# Patient Record
Sex: MALE | Race: BLACK OR AFRICAN AMERICAN | URBAN - METROPOLITAN AREA
[De-identification: names, ages, dates, MRNs, and addresses within clinical notes are randomized per-mention and may not be internally consistent; named-entity substitution may affect disease eponyms.]

---

## 2022-11-15 ENCOUNTER — HOSPITAL ENCOUNTER (EMERGENCY)
Age: 27
Discharge: HOME | End: 2022-11-15

## 2022-11-15 VITALS
SYSTOLIC BLOOD PRESSURE: 172 MMHG | HEART RATE: 66 BPM | DIASTOLIC BLOOD PRESSURE: 72 MMHG | OXYGEN SATURATION: 96 % | RESPIRATION RATE: 20 BRPM | BODY MASS INDEX: 36.94 KG/M2 | WEIGHT: 280 LBS | TEMPERATURE: 98.2 F

## 2022-11-15 DIAGNOSIS — H10.45 OTHER CHRONIC ALLERGIC CONJUNCTIVITIS OF BOTH EYES: Primary | ICD-10-CM

## 2022-11-15 PROCEDURE — 99283 EMERGENCY DEPT VISIT LOW MDM: CPT

## 2022-11-15 RX ORDER — KETOTIFEN FUMARATE 0.35 MG/ML
1 SOLUTION/ DROPS OPHTHALMIC 2 TIMES DAILY
Qty: 1 ML | Refills: 0 | Status: SHIPPED | OUTPATIENT
Start: 2022-11-15 | End: 2022-11-25

## 2022-11-15 ASSESSMENT — PAIN - FUNCTIONAL ASSESSMENT: PAIN_FUNCTIONAL_ASSESSMENT: NONE - DENIES PAIN

## 2022-11-15 ASSESSMENT — ENCOUNTER SYMPTOMS
EYE ITCHING: 1
VOMITING: 0
ABDOMINAL PAIN: 0
DIARRHEA: 0
WHEEZING: 0
RHINORRHEA: 0
PHOTOPHOBIA: 0
EYE DISCHARGE: 1
SHORTNESS OF BREATH: 0
NAUSEA: 0
EYE PAIN: 0
COUGH: 0
EYE REDNESS: 1

## 2022-11-15 ASSESSMENT — VISUAL ACUITY
OU: 20/15
OS: 20/20
OD: 20/25

## 2022-11-15 NOTE — ED NOTES
Discharge paperwork given to and reviewed with pt. Pt verbalized understanding and all questions answered. Pt encouraged to return if having worsening symptoms or new symptoms discussed in discharge paperwork. Pt to follow up with [facility]  Rx x 1 given and medications reviewed with pt. Pt in NAD, RR even and unlabored.  Pt off unit ambulatory     [facility]  11/15/22 5775

## 2022-11-15 NOTE — ED PROVIDER NOTES
[facility]        Pt Name: Nicole Caruso  MRN: [id number]  [place] 1995  Date of evaluation: 11/15/2022  Provider: Shazia Harper PA-C  PCP: No primary care provider on file. Note Started: 9:43 AM EST 11/15/2022        POWER. I have evaluated this patient. My supervising physician was available for consultation. CHIEF COMPLAINT       Chief Complaint   Patient presents with    Conjunctivitis     C/o left eye watering, itchyness and redness times 2 weeks. Went to urgent care, no relief       HISTORY OF PRESENT ILLNESS   (Location, Timing/Onset, Context/Setting, Quality, Duration, Modifying Factors, Severity, Associated Signs and Symptoms)  Note limiting factors. Chief Complaint: Eye drainage     Nicole Caruso is a 32 y.o. male who presents for evaluation of bilateral eye itching watering and redness x2 weeks. States that he went to urgent care last week and was given prescriptions for polymyxin B and Augmentin. Symptoms have not improved. He denies injury or trauma. No foreign body or foreign body sensation. No visual changes. He does report history of seasonal allergies but does not take daily allergy medication. He also has some nasal congestion and sneezing at this time. No chest pain or shortness of breath. No cough. No abdominal pain nausea vomiting. No fevers or chills. He has no other complaints or concerns at this time. Nursing Notes were all reviewed and agreed with or any disagreements were addressed in the HPI. REVIEW OF SYSTEMS    (2-9 systems for level 4, 10 or more for level 5)     Review of Systems   Constitutional:  Negative for appetite change, chills and fever. HENT:  Negative for congestion and rhinorrhea. Eyes:  Positive for discharge, redness and itching. Negative for photophobia and pain. Respiratory:  Negative for cough, shortness of breath and wheezing.     Cardiovascular: Negative for chest pain. Gastrointestinal:  Negative for abdominal pain, diarrhea, nausea and vomiting. Genitourinary:  Negative for difficulty urinating, dysuria and hematuria. Musculoskeletal:  Negative for neck pain and neck stiffness. Skin:  Negative for rash. Neurological:  Negative for headaches. Positives and Pertinent negatives as per HPI. Except as noted above in the ROS, all other systems were reviewed and negative. PAST MEDICAL HISTORY   History reviewed. No pertinent past medical history. SURGICAL HISTORY     Past Surgical History:   Procedure Laterality Date    ANKLE SURGERY      ORIF and ligament repair left ankle         CURRENTMEDICATIONS       Previous Medications    No medications on file         ALLERGIES     Patient has no known allergies. FAMILYHISTORY     History reviewed. No pertinent family history. SOCIAL HISTORY       Social History     Tobacco Use    Smoking status: Never    Smokeless tobacco: Never   Substance Use Topics    Alcohol use: No    Drug use: No       SCREENINGS    [place] Coma Scale  Eye Opening: Spontaneous  Best Verbal Response: Oriented  Best Motor Response: Obeys commands  [place] Coma Scale Score: 15        PHYSICAL EXAM    (up to 7 for level 4, 8 or more for level 5)     ED Triage Vitals [11/15/22 0928]   BP Temp Temp Source Heart Rate Resp SpO2 Height Weight   (!) 172/72 98.2 °F (36.8 °C) Oral 66 20 96 % -- 280 lb (127 kg)       Physical Exam  Vitals and nursing note reviewed. Constitutional:       Appearance: He is well-developed. He is not diaphoretic. HENT:      Head: Normocephalic and atraumatic. Right Ear: External ear normal.      Left Ear: External ear normal.      Nose: Nose normal.   Eyes:      General: Lids are normal. Lids are everted, no foreign bodies appreciated. Right eye: No foreign body or discharge. Left eye: No foreign body or discharge. Extraocular Movements: Extraocular movements intact. Conjunctiva/sclera:      Right eye: Right conjunctiva is injected. Chemosis present. No exudate or hemorrhage. Left eye: Left conjunctiva is injected. Chemosis present. No exudate or hemorrhage. Pulmonary:      Effort: Pulmonary effort is normal. No respiratory distress. Musculoskeletal:         General: Normal range of motion. Cervical back: Normal range of motion and neck supple. Skin:     General: Skin is warm and dry. Neurological:      Mental Status: He is alert and oriented to person, place, and time. Psychiatric:         Behavior: Behavior normal.       DIAGNOSTIC RESULTS   LABS:    Labs Reviewed - No data to display    When ordered only abnormal lab results are displayed. All other labs were within normal range or not returned as of this dictation. EKG: When ordered, EKG's are interpreted by the Emergency Department Physician in the absence of a cardiologist.  Please see their note for interpretation of EKG. RADIOLOGY:   Non-plain film images such as CT, Ultrasound and MRI are read by the radiologist. Plain radiographic images are visualized and preliminarily interpreted by the ED Provider with the below findings:        Interpretation per the Radiologist below, if available at the time of this note:    No orders to display     No results found. PROCEDURES   Unless otherwise noted below, none     Procedures    CRITICAL CARE TIME       CONSULTS:  None      EMERGENCY DEPARTMENT COURSE and DIFFERENTIAL DIAGNOSIS/MDM:   Vitals:    Vitals:    11/15/22 0928   BP: (!) 172/72   Pulse: 66   Resp: 20   Temp: 98.2 °F (36.8 °C)   TempSrc: Oral   SpO2: 96%   Weight: 280 lb (127 kg)       Patient was given the following medications:  Medications - No data to display      Is this patient to be included in the SEP-1 Core Measure due to severe sepsis or septic shock? No   Exclusion criteria - the patient is NOT to be included for SEP-1 Core Measure due to:   Infection is not suspected    Patient presents for evaluation of bilateral eye redness itching and drainage. On exam, he is resting comfortably in bed no acute distress and nontoxic. He is hypertensive but vitals otherwise stable and he is afebrile. He has bilateral conjunctival injection with clear drainage noted. Mild chemosis. No exudates or crusting. No periorbital edema or erythema. Pupils are equal round and reactive to light and extraocular movements are intact without pain or difficulty. Visual acuity performed by nursing staff. Symptoms are likely allergic in nature. He was encouraged to continue taking allergy medication daily and was also given Zaditor ophthalmic drops. I estimate there is LOW risk for FOREIGN BODY, HYPHEMA, SUBCONJUNCTIVAL HEMORRHAGE, PERIORBITAL CELLULITIS, ORBITAL CELLULITIS, CORNEAL ULCER OR ABRASION,  PENETRATING GLOBE INJURY, CENTRAL RETINAL ARTERY OCCLUSION, ACUTE GLAUCOMA, DENDRITIC PATTERN, RETINAL VEIN THROMBOSIS, OR HERPETIC KERATITIS, thus I consider the discharge disposition reasonable. Patient is given strict return precautions to return with worsening pain, vision changes, neck stiffness or fever, or any other concerns. He is agreeable to this plan and stable for discharge at this time. FINAL IMPRESSION      1.  Other chronic allergic conjunctivitis of both eyes          DISPOSITION/PLAN   DISPOSITION Decision To Discharge 11/15/2022 09:42:29 AM      PATIENT REFERRED TO:  [address] [place] [address] [address]    Schedule an appointment as soon as possible for a visit       [facility]  [address] [place] [address]  [phone]  Go to   If symptoms worsen      DISCHARGE MEDICATIONS:  New Prescriptions    KETOTIFEN (ZADITOR) 0.025 % OPHTHALMIC SOLUTION    Place 1 drop into both eyes 2 times daily for 10 days       DISCONTINUED MEDICATIONS:  Discontinued Medications    No medications on file (Please note that portions of this note were completed with a voice recognition program.  Efforts were made to edit the dictations but occasionally words are mis-transcribed.)    Demi Esquivel PA-C (electronically signed)            Mercy Geiger PA-C  11/15/22 [address] [place], [place]  11/15/22 4530